# Patient Record
Sex: MALE | Race: WHITE | NOT HISPANIC OR LATINO | Employment: UNEMPLOYED | URBAN - METROPOLITAN AREA
[De-identification: names, ages, dates, MRNs, and addresses within clinical notes are randomized per-mention and may not be internally consistent; named-entity substitution may affect disease eponyms.]

---

## 2024-01-19 NOTE — PROGRESS NOTES
ERIN PHYSICAL EXAM     Patient: Chad Birch  YOB: 1985    Date of Exam: 1/22/24  Arrival Time: 08 00 AM  Departure Time: 09 00 AM    Allergies: Not on File    Patient Concerns: No chief complaint on file.    Chad is here for an [facility] physical and health maintenance.  His only health concern today is a rash on his face that is new to him.  He has noticed this for about 2-3 months.  It is redness just under his eyes and in his nasolabial folds, he has this redness and dandruff in his eyebrows.  It is not itchy.  He tried OTC lotions and creams, that was not helpful.  He has tried coconut oil, that seems to be the most helpful.      Chad has been at [facility] for 12 days.  Chad's drug of choice is alcohol, this is his first time in treatment.  He has never used any other drugs, he has never used IV drugs.  He is required due to a legal issue to be in treatment.    Chad was treated for a DVT in his left leg years ago.  He slept on a 10 hour flight with his leg bent the whole time, he feels that was the reason for the DVT.  He was anticoagulated for 2 months then it was discontinued.  He has had no incidents or issues with that since.  There was no studies related to coagulation done at that time that he was aware of.    Chad is from [place], he has done many things for work.  He has been an  in [place] for 6 years, he has done legal policy work.    Immunizations:   Most Recent Immunizations   Administered Date(s) Administered     COVID-19 MONOVALENT 12+ (Pfizer) 04/22/2021     Hepatitis A (ADULT 19+) 07/24/2008     Hepatitis B, Peds 07/24/2008     MMR 06/29/1988     Meningococcal (Menomune ) 08/12/2003     TDAP (Adacel,Boostrix) 12/07/2023     Typhoid Oral 07/24/2008       Do you need any refills on your Medications today? No    Free of communicable diseases? Testing today    Health Maintenance:     ROS  Review Of Systems  Skin: as above  Eyes: negative  Ears/Nose/Throat:  negative  Respiratory: No shortness of breath, dyspnea on exertion, cough, or hemoptysis  Cardiovascular: negative  Gastrointestinal: negative  Genitourinary: negative  Musculoskeletal: negative  Neurologic: negative  Psychiatric: negative  Hematologic/Lymphatic/Immunologic: negative  Endocrine: negative    General Physical Exam:  Vitals: There were no vitals taken for this visit.  Past Medical History Reviewed? Yes.  Constitutional:   awake, alert, cooperative, no apparent distress, and appears stated age   Eyes:   Lids and lashes normal, pupils equal, round and reactive to light, extra ocular muscles intact, sclera clear, conjunctiva normal   ENT:   Normocephalic, without obvious abnormality, atraumatic, sinuses nontender on palpation, external ears without lesions, oral pharynx with moist mucous membranes, tonsils without erythema or exudates, gums normal and good dentition.   Neck:   Supple, symmetrical, trachea midline, no adenopathy, thyroid symmetric, not enlarged and no tenderness, skin normal   Hematologic / Lymphatic:   no cervical lymphadenopathy and no supraclavicular lymphadenopathy   Back:   Symmetric, no curvature, spinous processes are non-tender on palpation, paraspinous muscles are non-tender on palpation, no costal vertebral tenderness   Lungs:   No increased work of breathing, good air exchange, clear to auscultation bilaterally, no crackles or wheezing   Cardiovascular:   Normal apical impulse, regular rate and rhythm, normal S1 and S2, no S3 or S4, and no murmur noted   Abdomen:   No scars, normal bowel sounds, soft, non-distended, non-tender, no masses palpated, no hepatosplenomegally   Chest / Breast:   Breasts symmetrical, skin without lesion(s), no nipple retraction or dimpling, no nipple discharge, no masses palpated, no axillary or supraclavicular adenopathy   Genitounirinary:   phallus meatus circumcised, right and left testis normal, right and left epididymis and vas deferens normal,  scrotal skin normal and perineum normal   Musculoskeletal:   There is no redness, warmth, or swelling of the joints.  Full range of motion noted.  Motor strength is 5 out of 5 all extremities bilaterally.  Tone is normal.   Neurologic:   Awake, alert, oriented to name, place and time.  Cranial nerves II-XII are grossly intact.  Motor is 5 out of 5 bilaterally.  Cerebellar finger to nose, heel to shin intact.  Sensory is intact.  Babinski down going, Romberg negative, and gait is normal.   Neuropsychiatric:   General: normal, calm and normal eye contact  Level of consciousness: alert / normal  Affect: normal  Orientation: oriented to self, place, time and situation  Memory and insight: normal, memory for past and recent events intact and thought process normal   Skin:   Red, dry skin in eyebrows, with dandruff, slight redness just below eyes and in nasolabial fold.       Recommended Diet and Special Instructions: No  Limitations or restrictions for activities: No  Information Pertinent to treatment in case of emergency None    Diagnoses:     (Z00.00) Routine history and physical examination of adult  (primary encounter diagnosis)  Plan: CBC with platelets differential, Lipid panel         reflex to direct LDL Fasting, Comprehensive         metabolic panel    (F19.10) Substance abuse (H)    Plan: Hepatitis B Surface Antibody, Hepatitis B         surface antigen, Hepatitis C Screen Reflex to         HCV RNA Quant and Genotype, HIV Antigen         Antibody Combo, Hepatitis A antibody IgM,         Hepatitis A Antibody Total    (Z59.3) Lives in group home    Plan: Quantiferon TB Gold Plus    (L21.9) Seborrheic dermatitis    Plan: ketoconazole (NIZORAL) 2 % external shampoo, use daily as a wash to rash areas        hydrocortisone (CORTAID) 1 % external cream, use sparingly avoiding the eyes once or twice daily    Medication Changes: MEDICATIONS:        - Continue other medications without change    Referrals   NO REFERRALS  MADE TODAY    Follow up plan   Follow up as needed      Aida Yang NP        Answers for HPI/ROS submitted by the patient on 1/22/2024  PHQ9 TOTAL SCORE: 0  KENROY 7 TOTAL SCORE: 0

## 2024-01-22 ENCOUNTER — OFFICE VISIT (OUTPATIENT)
Dept: FAMILY MEDICINE | Facility: CLINIC | Age: 39
End: 2024-01-22
Payer: COMMERCIAL

## 2024-01-22 VITALS
SYSTOLIC BLOOD PRESSURE: 117 MMHG | OXYGEN SATURATION: 96 % | BODY MASS INDEX: 21.8 KG/M2 | TEMPERATURE: 97.9 F | RESPIRATION RATE: 19 BRPM | HEART RATE: 99 BPM | WEIGHT: 155.7 LBS | HEIGHT: 71 IN | DIASTOLIC BLOOD PRESSURE: 78 MMHG

## 2024-01-22 DIAGNOSIS — Z00.00 ROUTINE HISTORY AND PHYSICAL EXAMINATION OF ADULT: Primary | ICD-10-CM

## 2024-01-22 DIAGNOSIS — L21.9 SEBORRHEIC DERMATITIS: ICD-10-CM

## 2024-01-22 DIAGNOSIS — F19.10 SUBSTANCE ABUSE (H): ICD-10-CM

## 2024-01-22 DIAGNOSIS — Z78.9 LIVES IN GROUP HOME: ICD-10-CM

## 2024-01-22 LAB
BASOPHILS # BLD AUTO: 0 10E3/UL (ref 0–0.2)
BASOPHILS NFR BLD AUTO: 1 %
EOSINOPHIL # BLD AUTO: 0.1 10E3/UL (ref 0–0.7)
EOSINOPHIL NFR BLD AUTO: 4 %
ERYTHROCYTE [DISTWIDTH] IN BLOOD BY AUTOMATED COUNT: 11.5 % (ref 10–15)
HCT VFR BLD AUTO: 40.1 % (ref 40–53)
HCV AB SERPL QL IA: NONREACTIVE
HGB BLD-MCNC: 14.1 G/DL (ref 13.3–17.7)
IMM GRANULOCYTES # BLD: 0 10E3/UL
IMM GRANULOCYTES NFR BLD: 0 %
LYMPHOCYTES # BLD AUTO: 1.4 10E3/UL (ref 0.8–5.3)
LYMPHOCYTES NFR BLD AUTO: 40 %
MCH RBC QN AUTO: 31.7 PG (ref 26.5–33)
MCHC RBC AUTO-ENTMCNC: 35.2 G/DL (ref 31.5–36.5)
MCV RBC AUTO: 90 FL (ref 78–100)
MONOCYTES # BLD AUTO: 0.3 10E3/UL (ref 0–1.3)
MONOCYTES NFR BLD AUTO: 9 %
NEUTROPHILS # BLD AUTO: 1.7 10E3/UL (ref 1.6–8.3)
NEUTROPHILS NFR BLD AUTO: 46 %
NRBC # BLD AUTO: 0 10E3/UL
NRBC BLD AUTO-RTO: 0 /100
PLATELET # BLD AUTO: 194 10E3/UL (ref 150–450)
RBC # BLD AUTO: 4.45 10E6/UL (ref 4.4–5.9)
WBC # BLD AUTO: 3.6 10E3/UL (ref 4–11)

## 2024-01-22 PROCEDURE — 80061 LIPID PANEL: CPT | Mod: ORL | Performed by: NURSE PRACTITIONER

## 2024-01-22 PROCEDURE — 86706 HEP B SURFACE ANTIBODY: CPT | Mod: ORL | Performed by: NURSE PRACTITIONER

## 2024-01-22 PROCEDURE — 87389 HIV-1 AG W/HIV-1&-2 AB AG IA: CPT | Mod: ORL | Performed by: NURSE PRACTITIONER

## 2024-01-22 PROCEDURE — 87340 HEPATITIS B SURFACE AG IA: CPT | Mod: ORL | Performed by: NURSE PRACTITIONER

## 2024-01-22 PROCEDURE — 85025 COMPLETE CBC W/AUTO DIFF WBC: CPT | Mod: ORL | Performed by: NURSE PRACTITIONER

## 2024-01-22 PROCEDURE — 86708 HEPATITIS A ANTIBODY: CPT | Mod: ORL | Performed by: NURSE PRACTITIONER

## 2024-01-22 PROCEDURE — 86481 TB AG RESPONSE T-CELL SUSP: CPT | Mod: ORL | Performed by: NURSE PRACTITIONER

## 2024-01-22 PROCEDURE — 86803 HEPATITIS C AB TEST: CPT | Mod: ORL | Performed by: NURSE PRACTITIONER

## 2024-01-22 PROCEDURE — 80053 COMPREHEN METABOLIC PANEL: CPT | Mod: ORL | Performed by: NURSE PRACTITIONER

## 2024-01-22 PROCEDURE — 86709 HEPATITIS A IGM ANTIBODY: CPT | Mod: ORL | Performed by: NURSE PRACTITIONER

## 2024-01-22 RX ORDER — BENZOCAINE/MENTHOL 6 MG-10 MG
LOZENGE MUCOUS MEMBRANE 2 TIMES DAILY
Qty: 14 G | Refills: 1 | Status: SHIPPED | OUTPATIENT
Start: 2024-01-22

## 2024-01-22 RX ORDER — KETOCONAZOLE 20 MG/ML
SHAMPOO TOPICAL DAILY PRN
Qty: 100 ML | Refills: 1 | Status: SHIPPED | OUTPATIENT
Start: 2024-01-22

## 2024-01-22 ASSESSMENT — PATIENT HEALTH QUESTIONNAIRE - PHQ9
SUM OF ALL RESPONSES TO PHQ QUESTIONS 1-9: 0
SUM OF ALL RESPONSES TO PHQ QUESTIONS 1-9: 0
5. POOR APPETITE OR OVEREATING: NOT AT ALL

## 2024-01-22 ASSESSMENT — ANXIETY QUESTIONNAIRES
GAD7 TOTAL SCORE: 0
6. BECOMING EASILY ANNOYED OR IRRITABLE: NOT AT ALL
3. WORRYING TOO MUCH ABOUT DIFFERENT THINGS: NOT AT ALL
1. FEELING NERVOUS, ANXIOUS, OR ON EDGE: NOT AT ALL
GAD7 TOTAL SCORE: 0
7. FEELING AFRAID AS IF SOMETHING AWFUL MIGHT HAPPEN: NOT AT ALL
6. BECOMING EASILY ANNOYED OR IRRITABLE: NOT AT ALL
1. FEELING NERVOUS, ANXIOUS, OR ON EDGE: NOT AT ALL
GAD7 TOTAL SCORE: 0
7. FEELING AFRAID AS IF SOMETHING AWFUL MIGHT HAPPEN: NOT AT ALL
7. FEELING AFRAID AS IF SOMETHING AWFUL MIGHT HAPPEN: NOT AT ALL
GAD7 TOTAL SCORE: 0
4. TROUBLE RELAXING: NOT AT ALL
2. NOT BEING ABLE TO STOP OR CONTROL WORRYING: NOT AT ALL
3. WORRYING TOO MUCH ABOUT DIFFERENT THINGS: NOT AT ALL
2. NOT BEING ABLE TO STOP OR CONTROL WORRYING: NOT AT ALL
5. BEING SO RESTLESS THAT IT IS HARD TO SIT STILL: NOT AT ALL
5. BEING SO RESTLESS THAT IT IS HARD TO SIT STILL: NOT AT ALL

## 2024-01-22 ASSESSMENT — PAIN SCALES - GENERAL: PAINLEVEL: NO PAIN (0)

## 2024-01-22 NOTE — LETTER
February 5, 2024      Chad Birch  [address] [address]        Dear KaiaEyal,    We are writing to inform you of your test results.    Your test results fall within the expected range(s) or remain unchanged from previous results.  Please continue with current treatment plan.    Resulted Orders   Lipid panel reflex to direct LDL Fasting   Result Value Ref Range    Cholesterol 184 <200 mg/dL    Triglycerides 90 <150 mg/dL    Direct Measure HDL 45 >=40 mg/dL    LDL Cholesterol Calculated 121 (H) <=100 mg/dL    Non HDL Cholesterol 139 (H) <130 mg/dL    Patient Fasting > 8hrs? Unknown     Narrative    Cholesterol  Desirable:  <200 mg/dL    Triglycerides  Normal:  Less than 150 mg/dL  Borderline High:  150-199 mg/dL  High:  200-499 mg/dL  Very High:  Greater than or equal to 500 mg/dL    Direct Measure HDL  Female:  Greater than or equal to 50 mg/dL   Male:  Greater than or equal to 40 mg/dL    LDL Cholesterol  Desirable:  <100mg/dL  Above Desirable:  100-129 mg/dL   Borderline High:  130-159 mg/dL   High:  160-189 mg/dL   Very High:  >= 190 mg/dL    Non HDL Cholesterol  Desirable:  130 mg/dL  Above Desirable:  130-159 mg/dL  Borderline High:  160-189 mg/dL  High:  190-219 mg/dL  Very High:  Greater than or equal to 220 mg/dL   Comprehensive metabolic panel   Result Value Ref Range    Sodium 140 135 - 145 mmol/L      Comment:      Reference intervals for this test were updated on 09/26/2023 to more accurately reflect our healthy population. There may be differences in the flagging of prior results with similar values performed with this method. Interpretation of those prior results can be made in the context of the updated reference intervals.     Potassium 4.0 3.4 - 5.3 mmol/L    Carbon Dioxide (CO2) 26 22 - 29 mmol/L    Anion Gap 10 7 - 15 mmol/L    Urea Nitrogen 8.1 6.0 - 20.0 mg/dL    Creatinine 0.94 0.67 - 1.17 mg/dL    GFR Estimate >90 >60 mL/min/1.73m2    Calcium 9.3 8.6 - 10.0  mg/dL    Chloride 104 98 - 107 mmol/L    Glucose 96 70 - 99 mg/dL    Alkaline Phosphatase 67 40 - 150 U/L      Comment:      Reference intervals for this test were updated on 11/14/2023 to more accurately reflect our healthy population. There may be differences in the flagging of prior results with similar values performed with this method. Interpretation of those prior results can be made in the context of the updated reference intervals.    AST 23 0 - 45 U/L      Comment:      Reference intervals for this test were updated on 6/12/2023 to more accurately reflect our healthy population. There may be differences in the flagging of prior results with similar values performed with this method. Interpretation of those prior results can be made in the context of the updated reference intervals.    ALT 17 0 - 70 U/L      Comment:      Reference intervals for this test were updated on 6/12/2023 to more accurately reflect our healthy population. There may be differences in the flagging of prior results with similar values performed with this method. Interpretation of those prior results can be made in the context of the updated reference intervals.      Protein Total 6.9 6.4 - 8.3 g/dL    Albumin 4.5 3.5 - 5.2 g/dL    Bilirubin Total 0.2 <=1.2 mg/dL   Hepatitis B Surface Antibody   Result Value Ref Range    Hepatitis B Surface Antibody Reactive       Comment:      A reactive result indicates recovery from acute or chronic hepatitis B virus (HBV) infection or acquired immunity from HBV vaccination. This assay does not differentiate between a vaccine-induced immune response and an immune response induced by infection with HBV. A positive total antihepatitis B core result would indicate that the hepatitis B surface antibody response is due to past HBV infection.    Hepatitis B Surface Antibody Instrument Value 262.00 <8.5 m[IU]/mL   Hepatitis B surface antigen   Result Value Ref Range    Hepatitis B Surface Antigen  Nonreactive Nonreactive   Hepatitis C Screen Reflex to HCV RNA Quant and Genotype   Result Value Ref Range    Hepatitis C Antibody Nonreactive Nonreactive      Comment:      A nonreactive screening test result does not exclude the possibility of exposure to or infection with HCV. Nonreactive screening test results in individuals with prior exposure to HCV may be due to antibody levels below the limit of detection of this assay or lack of reactivity to the HCV antigens used in this assay. Patients with recent HCV infections (<3 months from time of exposure) may have false-negative HCV antibody results due to the time needed for seroconversion (average of 8 to 9 weeks).   HIV Antigen Antibody Combo   Result Value Ref Range    HIV Antigen Antibody Combo Nonreactive Nonreactive      Comment:      Negative HIV-1/-2 antigen and antibody screening test results usually indicate the absence of HIV-1 and HIV-2 infection. However, such negative results do not rule-out acute HIV infection.  If acute HIV-1 or HIV-2 infection is suspected, detection of HIV-1 or HIV-2 RNA  is recommended.    Hepatitis A antibody IgM   Result Value Ref Range    Hepatitis A Antibody IgM Nonreactive Nonreactive      Comment:      Nonreactive results indicate either inadequate or delayed anti-HAV IgM response after known exposure to HAV or absence of acute or recent hepatitis A.   Hepatitis A Antibody Total   Result Value Ref Range    Hepatitis A Antibody Total Reactive       Comment:      This assay detects the presence of anti-hepatitis A virus (anti-HAV) total (both IgG and IgM combined). If clinically indicated, specific testing for anti-HAV IgM (HAVM / Hepatitis A IgM Antibody, Serum) is necessary to confirm the presence of acute or recent hepatitis A. Please see interpretation guide below.    Narrative    HAV antibody testing interpretation chart:      HAV Total Antibody - NONREACTIVE  HAV IgM - NOT TESTED  Comments: No evidence of vaccination  or previous infection; Susceptible to Hepatitis A infection     HAV Total Antibody - REACTIVE   HAV IgM - NOT TESTED  Comments:Consistent with recent or remote Hepatitis A infection or antibody response to HAV vaccination    HAV Total Antibody - REACTIVE  HAV IgM - NONREACTIVE  Comments:Consistent with resolved Hepatitis A infection or antibody response to HAV vaccination    HAV Total Antibody - REACTIVE  HAV IgM - REACTIVE  Comments:Consistent with active Hepatitis A infection   CBC with platelets and differential   Result Value Ref Range    WBC Count 3.6 (L) 4.0 - 11.0 10e3/uL    RBC Count 4.45 4.40 - 5.90 10e6/uL    Hemoglobin 14.1 13.3 - 17.7 g/dL    Hematocrit 40.1 40.0 - 53.0 %    MCV 90 78 - 100 fL    MCH 31.7 26.5 - 33.0 pg    MCHC 35.2 31.5 - 36.5 g/dL    RDW 11.5 10.0 - 15.0 %    Platelet Count 194 150 - 450 10e3/uL    % Neutrophils 46 %    % Lymphocytes 40 %    % Monocytes 9 %    % Eosinophils 4 %    % Basophils 1 %    % Immature Granulocytes 0 %    NRBCs per 100 WBC 0 <1 /100    Absolute Neutrophils 1.7 1.6 - 8.3 10e3/uL    Absolute Lymphocytes 1.4 0.8 - 5.3 10e3/uL    Absolute Monocytes 0.3 0.0 - 1.3 10e3/uL    Absolute Eosinophils 0.1 0.0 - 0.7 10e3/uL    Absolute Basophils 0.0 0.0 - 0.2 10e3/uL    Absolute Immature Granulocytes 0.0 <=0.4 10e3/uL    Absolute NRBCs 0.0 10e3/uL   Quantiferon TB Gold Plus Grey Tube   Result Value Ref Range    Quantiferon Nil Tube 0.03 IU/mL   Quantiferon TB Gold Plus Green Tube   Result Value Ref Range    Quantiferon TB1 Tube 0.03 IU/mL   Quantiferon TB Gold Plus Yellow Tube   Result Value Ref Range    Quantiferon TB2 Tube 0.03    Quantiferon TB Gold Plus Purple Tube   Result Value Ref Range    Quantiferon Mitogen 10.00 IU/mL   Quantiferon TB Gold Plus   Result Value Ref Range    Quantiferon-TB Gold Plus Negative Negative      Comment:      No interferon gamma response to M.tuberculosis antigens was detected. Infection with M.tuberculosis is unlikely, however a single  negative result does not exclude infection. In patients at high risk for infection, a second test should be considered in accordance with the 2017 ATS/IDSA/CDC Clinical Pract  ice Guidelines for Diagnosis of Tuberculosis in Adults and Children     TB1 Ag minus Nil Value 0.00 IU/mL    TB2 Ag minus Nil Value 0.00 IU/mL    Mitogen minus Nil Result 9.97 IU/mL    Nil Result 0.03 IU/mL       If you have any questions or concerns, please call the clinic at the number listed above.       Sincerely,      Aida Yang NP

## 2024-01-22 NOTE — NURSING NOTE
"ROOM:1  SONIYA REMY    Preferred Name: Chad     How did you hear about us?  Other - RS Monisha    38 year old  Chief Complaint   Patient presents with     Physical     Dry skin on face       Blood pressure 117/78, pulse 99, temperature 97.9  F (36.6  C), temperature source Oral, resp. rate 19, height 1.793 m (5' 10.6\"), weight 70.6 kg (155 lb 11.2 oz), SpO2 96%. Body mass index is 21.96 kg/m .  BP completed using cuff size:        There is no problem list on file for this patient.      Wt Readings from Last 2 Encounters:   01/22/24 70.6 kg (155 lb 11.2 oz)     BP Readings from Last 3 Encounters:   01/22/24 117/78       No Known Allergies    Current Outpatient Medications   Medication     acetaminophen (TYLENOL) 325 MG tablet     naltrexone (DEPADE/REVIA) 50 MG tablet     risperiDONE (RISPERDAL) 2 MG tablet     sodium chloride (OCEAN) 0.65 % nasal spray     No current facility-administered medications for this visit.       Social History     Tobacco Use     Smoking status: Every Day     Types: Cigarettes     Smokeless tobacco: Former   Vaping Use     Vaping Use: Former   Substance Use Topics     Alcohol use: Not Currently     Drug use: Never       Honoring Choices - Health Care Directive Guide offered to patient at time of visit.    Health Maintenance Due   Topic Date Due     YEARLY PREVENTIVE VISIT  Never done     ADVANCE CARE PLANNING  Never done     HIV SCREENING  Never done     HEPATITIS C SCREENING  Never done     LIPID  Never done     INFLUENZA VACCINE (1) Never done     COVID-19 Vaccine (3 - 2023-24 season) 09/01/2023       Immunization History   Administered Date(s) Administered     COVID-19 MONOVALENT 12+ (Pfizer) 04/01/2021, 04/22/2021     Hepatitis A (ADULT 19+) 07/24/2008     Hepatitis B, Peds 09/18/1997, 10/28/1997, 03/10/1998, 07/24/2008     MMR 05/22/1986, 06/29/1988     Meningococcal (Menomune ) 08/12/2003     TDAP (Adacel,Boostrix) 07/24/2008, 12/07/2023     Typhoid Oral 07/24/2008       No " "results found for: \"PAP\"    No lab results found.        1/22/2024     8:11 AM   PHQ-2 ( 1999 Pfizer)   Q1: Little interest or pleasure in doing things 0   Q2: Feeling down, depressed or hopeless 0   PHQ-2 Score 0           1/22/2024     8:11 AM 1/22/2024     8:12 AM   PHQ-9 SCORE   PHQ-9 Total Score MyChart  0   PHQ-9 Total Score 0 0           1/22/2024     8:11 AM 1/22/2024     8:12 AM   KENROY-7 SCORE   Total Score  0 (minimal anxiety)   Total Score 0 0            No data to display                Kinga Barron, EMT    January 22, 2024 8:17 AM    "

## 2024-01-23 LAB
ALBUMIN SERPL BCG-MCNC: 4.5 G/DL (ref 3.5–5.2)
ALP SERPL-CCNC: 67 U/L (ref 40–150)
ALT SERPL W P-5'-P-CCNC: 17 U/L (ref 0–70)
ANION GAP SERPL CALCULATED.3IONS-SCNC: 10 MMOL/L (ref 7–15)
AST SERPL W P-5'-P-CCNC: 23 U/L (ref 0–45)
BILIRUB SERPL-MCNC: 0.2 MG/DL
BUN SERPL-MCNC: 8.1 MG/DL (ref 6–20)
CALCIUM SERPL-MCNC: 9.3 MG/DL (ref 8.6–10)
CHLORIDE SERPL-SCNC: 104 MMOL/L (ref 98–107)
CHOLEST SERPL-MCNC: 184 MG/DL
CREAT SERPL-MCNC: 0.94 MG/DL (ref 0.67–1.17)
DEPRECATED HCO3 PLAS-SCNC: 26 MMOL/L (ref 22–29)
EGFRCR SERPLBLD CKD-EPI 2021: >90 ML/MIN/1.73M2
FASTING STATUS PATIENT QL REPORTED: ABNORMAL
GLUCOSE SERPL-MCNC: 96 MG/DL (ref 70–99)
HAV AB SER QL IA: REACTIVE
HAV IGM SERPL QL IA: NONREACTIVE
HBV SURFACE AB SERPL IA-ACNC: 262 M[IU]/ML
HBV SURFACE AB SERPL IA-ACNC: REACTIVE M[IU]/ML
HBV SURFACE AG SERPL QL IA: NONREACTIVE
HDLC SERPL-MCNC: 45 MG/DL
HIV 1+2 AB+HIV1 P24 AG SERPL QL IA: NONREACTIVE
LDLC SERPL CALC-MCNC: 121 MG/DL
NONHDLC SERPL-MCNC: 139 MG/DL
POTASSIUM SERPL-SCNC: 4 MMOL/L (ref 3.4–5.3)
PROT SERPL-MCNC: 6.9 G/DL (ref 6.4–8.3)
SODIUM SERPL-SCNC: 140 MMOL/L (ref 135–145)
TRIGL SERPL-MCNC: 90 MG/DL

## 2024-01-24 LAB
GAMMA INTERFERON BACKGROUND BLD IA-ACNC: 0.03 IU/ML
M TB IFN-G BLD-IMP: NEGATIVE
M TB IFN-G CD4+ BCKGRND COR BLD-ACNC: 9.97 IU/ML
MITOGEN IGNF BCKGRD COR BLD-ACNC: 0 IU/ML
MITOGEN IGNF BCKGRD COR BLD-ACNC: 0 IU/ML
QUANTIFERON MITOGEN: 10 IU/ML
QUANTIFERON NIL TUBE: 0.03 IU/ML
QUANTIFERON TB1 TUBE: 0.03 IU/ML
QUANTIFERON TB2 TUBE: 0.03